# Patient Record
Sex: FEMALE | Race: WHITE | Employment: OTHER | URBAN - METROPOLITAN AREA
[De-identification: names, ages, dates, MRNs, and addresses within clinical notes are randomized per-mention and may not be internally consistent; named-entity substitution may affect disease eponyms.]

---

## 2017-01-11 ENCOUNTER — HOSPITAL ENCOUNTER (OUTPATIENT)
Dept: MAMMOGRAPHY | Age: 67
Discharge: HOME | End: 2017-01-11
Attending: OBSTETRICS & GYNECOLOGY
Payer: MEDICARE

## 2017-01-11 DIAGNOSIS — Z12.31 VISIT FOR SCREENING MAMMOGRAM: ICD-10-CM

## 2017-01-11 PROCEDURE — 77067 SCR MAMMO BI INCL CAD: CPT

## 2017-10-13 ENCOUNTER — HOSPITAL ENCOUNTER (EMERGENCY)
Age: 67
Discharge: HOME | End: 2017-10-13
Attending: EMERGENCY MEDICINE
Payer: MEDICARE

## 2017-10-13 VITALS
TEMPERATURE: 98.5 F | HEART RATE: 77 BPM | BODY MASS INDEX: 28.35 KG/M2 | RESPIRATION RATE: 16 BRPM | WEIGHT: 160 LBS | DIASTOLIC BLOOD PRESSURE: 71 MMHG | HEIGHT: 63 IN | SYSTOLIC BLOOD PRESSURE: 129 MMHG | OXYGEN SATURATION: 97 %

## 2017-10-13 DIAGNOSIS — R42 LIGHTHEADEDNESS: ICD-10-CM

## 2017-10-13 DIAGNOSIS — R00.0 SINUS TACHYCARDIA: Primary | ICD-10-CM

## 2017-10-13 LAB
ALBUMIN SERPL-MCNC: 3.8 G/DL (ref 3.5–5)
ALBUMIN/GLOB SERPL: 0.9 {RATIO} (ref 1.1–2.2)
ALP SERPL-CCNC: 121 U/L (ref 45–117)
ALT SERPL-CCNC: 32 U/L (ref 12–78)
ANION GAP SERPL CALC-SCNC: 9 MMOL/L (ref 5–15)
AST SERPL-CCNC: 21 U/L (ref 15–37)
ATRIAL RATE: 117 BPM
BASOPHILS # BLD: 0.1 K/UL (ref 0–0.1)
BASOPHILS NFR BLD: 1 % (ref 0–1)
BILIRUB SERPL-MCNC: 0.3 MG/DL (ref 0.2–1)
BUN SERPL-MCNC: 18 MG/DL (ref 6–20)
BUN/CREAT SERPL: 18 (ref 12–20)
CALCIUM SERPL-MCNC: 8.8 MG/DL (ref 8.5–10.1)
CALCULATED P AXIS, ECG09: 46 DEGREES
CALCULATED R AXIS, ECG10: 38 DEGREES
CALCULATED T AXIS, ECG11: 13 DEGREES
CHLORIDE SERPL-SCNC: 104 MMOL/L (ref 97–108)
CO2 SERPL-SCNC: 27 MMOL/L (ref 21–32)
CREAT SERPL-MCNC: 1 MG/DL (ref 0.55–1.02)
D DIMER PPP FEU-MCNC: 0.25 MG/L FEU (ref 0–0.65)
DIAGNOSIS, 93000: NORMAL
DIFFERENTIAL METHOD BLD: ABNORMAL
EOSINOPHIL # BLD: 0.5 K/UL (ref 0–0.4)
EOSINOPHIL NFR BLD: 8 % (ref 0–7)
ERYTHROCYTE [DISTWIDTH] IN BLOOD BY AUTOMATED COUNT: 12.5 % (ref 11.5–14.5)
GLOBULIN SER CALC-MCNC: 4.1 G/DL (ref 2–4)
GLUCOSE SERPL-MCNC: 132 MG/DL (ref 65–100)
HCT VFR BLD AUTO: 40.8 % (ref 35–47)
HGB BLD-MCNC: 13.7 G/DL (ref 11.5–16)
LYMPHOCYTES # BLD: 2.3 K/UL (ref 0.8–3.5)
LYMPHOCYTES NFR BLD: 36 % (ref 12–49)
MAGNESIUM SERPL-MCNC: 1.8 MG/DL (ref 1.6–2.4)
MCH RBC QN AUTO: 31.1 PG (ref 26–34)
MCHC RBC AUTO-ENTMCNC: 33.6 G/DL (ref 30–36.5)
MCV RBC AUTO: 92.7 FL (ref 80–99)
MONOCYTES # BLD: 0.6 K/UL (ref 0–1)
MONOCYTES NFR BLD: 10 % (ref 5–13)
NEUTS SEG # BLD: 2.9 K/UL (ref 1.8–8)
NEUTS SEG NFR BLD: 45 % (ref 32–75)
P-R INTERVAL, ECG05: 156 MS
PLATELET # BLD AUTO: 272 K/UL (ref 150–400)
POTASSIUM SERPL-SCNC: 3.9 MMOL/L (ref 3.5–5.1)
PROT SERPL-MCNC: 7.9 G/DL (ref 6.4–8.2)
Q-T INTERVAL, ECG07: 324 MS
QRS DURATION, ECG06: 74 MS
QTC CALCULATION (BEZET), ECG08: 451 MS
RBC # BLD AUTO: 4.4 M/UL (ref 3.8–5.2)
SODIUM SERPL-SCNC: 140 MMOL/L (ref 136–145)
TROPONIN I SERPL-MCNC: 0.05 NG/ML
TSH SERPL DL<=0.05 MIU/L-ACNC: 1.34 UIU/ML (ref 0.36–3.74)
VENTRICULAR RATE, ECG03: 117 BPM
WBC # BLD AUTO: 6.4 K/UL (ref 3.6–11)

## 2017-10-13 PROCEDURE — 96360 HYDRATION IV INFUSION INIT: CPT

## 2017-10-13 PROCEDURE — 93005 ELECTROCARDIOGRAM TRACING: CPT

## 2017-10-13 PROCEDURE — 99284 EMERGENCY DEPT VISIT MOD MDM: CPT

## 2017-10-13 PROCEDURE — 74011250636 HC RX REV CODE- 250/636: Performed by: EMERGENCY MEDICINE

## 2017-10-13 PROCEDURE — 84443 ASSAY THYROID STIM HORMONE: CPT | Performed by: EMERGENCY MEDICINE

## 2017-10-13 PROCEDURE — 85025 COMPLETE CBC W/AUTO DIFF WBC: CPT | Performed by: EMERGENCY MEDICINE

## 2017-10-13 PROCEDURE — 36415 COLL VENOUS BLD VENIPUNCTURE: CPT | Performed by: EMERGENCY MEDICINE

## 2017-10-13 PROCEDURE — 85379 FIBRIN DEGRADATION QUANT: CPT | Performed by: EMERGENCY MEDICINE

## 2017-10-13 PROCEDURE — 80053 COMPREHEN METABOLIC PANEL: CPT | Performed by: EMERGENCY MEDICINE

## 2017-10-13 PROCEDURE — 84484 ASSAY OF TROPONIN QUANT: CPT | Performed by: EMERGENCY MEDICINE

## 2017-10-13 PROCEDURE — 83735 ASSAY OF MAGNESIUM: CPT | Performed by: EMERGENCY MEDICINE

## 2017-10-13 RX ORDER — ESTRADIOL 0.03 MG/D
PATCH TRANSDERMAL
Refills: 5 | COMMUNITY
Start: 2017-09-14

## 2017-10-13 RX ORDER — LISINOPRIL 20 MG/1
40 TABLET ORAL
Status: DISCONTINUED | OUTPATIENT
Start: 2017-10-13 | End: 2017-10-13

## 2017-10-13 RX ORDER — ESTRADIOL 0.1 MG/G
CREAM VAGINAL
Refills: 9 | COMMUNITY
Start: 2017-09-18

## 2017-10-13 RX ORDER — ESZOPICLONE 2 MG/1
TABLET, FILM COATED ORAL
Refills: 1 | COMMUNITY
Start: 2017-09-27 | End: 2020-11-19

## 2017-10-13 RX ADMIN — SODIUM CHLORIDE 1000 ML: 900 INJECTION, SOLUTION INTRAVENOUS at 04:58

## 2017-10-13 NOTE — Clinical Note
- Please call Dr. Fransico Rivas office today and arrange a follow-up appointment for further evaluation. 
- Avoid caffeine and alcohol.  
- TSH (thyroid test) is pending. 
- Return to ED for any concerns.

## 2017-10-13 NOTE — ED NOTES
The patient was discharged home by Dr. Valentine Bal and Emma Vieira rn in stable condition, accompanied by family. The patient is alert and oriented, is in no respiratory distress and has vital signs within normal limits . The patient's diagnosis, condition and treatment were explained to patient. The patient expressed understanding. No prescriptions given to pt. No work/school note given to pt. A discharge plan has been developed. A  was not involved in the process. Aftercare instructions were given to the patient. Pt's saline lock removed without complications. Family will transport pt home.

## 2017-10-13 NOTE — DISCHARGE INSTRUCTIONS
Cardiac Arrhythmia: Care Instructions  Your Care Instructions    A cardiac arrhythmia is a change in the normal rhythm of the heart. Your heart may beat too fast or too slow or beat with an irregular or skipping rhythm. A change in the heart's rhythm may feel like a really strong heartbeat or a fluttering in your chest. A severe heart rhythm problem can keep the body from getting the blood it needs. This can result in shortness of breath, lightheadedness, and fainting. You may take medicine to treat your condition. Your doctor may recommend a pacemaker or recommend catheter ablation to destroy small parts of the heart that are causing a rhythm problem. Another possible treatment is an implantable cardioverter-defibrillator (ICD). An ICD is a device that gives the heart a shock to return the heart to a normal rhythm. Follow-up care is a key part of your treatment and safety. Be sure to make and go to all appointments, and call your doctor if you are having problems. It's also a good idea to know your test results and keep a list of the medicines you take. How can you care for yourself at home? General care  · Be safe with medicines. Take your medicines exactly as prescribed. Call your doctor if you think you are having a problem with your medicine. You will get more details on the specific medicines your doctor prescribes. · If you received a pacemaker or an ICD, you will get a fact sheet about it. · Wear medical alert jewelry that says you have an abnormal heart rhythm. You can buy this at most drugstores. Lifestyle changes  · Eat a heart-healthy diet. · Stay at a healthy weight. Lose weight if you need to. · Avoid nicotine, too much alcohol, and illegal drugs (meth, speed, and cocaine). Also, get enough sleep and do not overeat. · Ask your doctor whether you can take over-the-counter medicines (such as decongestants).  These can make your heart beat fast.  · Talk to your doctor about any limits to activities, such as driving, or tasks where you use power tools or ladders. Activity  · Start light exercise if your doctor says you can. Even a small amount will help you get stronger, have more energy, and manage your stress. · If your doctor recommends it, get more exercise. Walking is a good choice. Bit by bit, increase the amount you walk every day. Try for at least 30 minutes on most days of the week. You also may want to swim, bike, or do other activities. · When you exercise, watch for signs that your heart is working too hard. You are pushing too hard if you cannot talk while you exercise. If you become short of breath or dizzy or have chest pain, sit down and rest.  · Check your pulse daily. Place two fingers on the artery at the palm side of your wrist, in line with your thumb. If your heartbeat seems uneven, talk to your doctor. When should you call for help? Call 911 anytime you think you may need emergency care. For example, call if:  · You passed out (lost consciousness). · You have symptoms of a heart attack. These may include:  ¨ Chest pain or pressure, or a strange feeling in the chest.  ¨ Sweating. ¨ Shortness of breath. ¨ Nausea or vomiting. ¨ Pain, pressure, or a strange feeling in the back, neck, jaw, or upper belly or in one or both shoulders or arms. ¨ Lightheadedness or sudden weakness. ¨ A fast or irregular heartbeat. After you call 911, the  may tell you to chew 1 adult-strength or 2 to 4 low-dose aspirin. Wait for an ambulance. Do not try to drive yourself. · You have signs of a stroke. These include:  ¨ Sudden numbness, paralysis, or weakness in your face, arm, or leg, especially on only one side of your body. ¨ New problems with walking or balance. ¨ Sudden vision changes. ¨ Drooling or slurred speech. ¨ New problems speaking or understanding simple statements, or feeling confused. ¨ A sudden, severe headache that is different from past headaches.   Call your doctor now or seek immediate medical care if:  · You are dizzy or lightheaded, or you feel like you may faint. · You have new or increased shortness of breath. · You had surgery and you have signs of infection, such as:  ¨ Increased pain, swelling, warmth, or redness. ¨ Red streaks leading from the cut (incision). ¨ Pus draining from the incision. ¨ A fever. Watch closely for changes in your health, and be sure to contact your doctor if:  · You do not get better as expected. Where can you learn more? Go to http://cathryn-john.info/. Enter E623 in the search box to learn more about \"Cardiac Arrhythmia: Care Instructions. \"  Current as of: May 5, 2016  Content Version: 11.3  © 7738-4568 [employer] Care instructions adapted under license by [employer] (which disclaims liability or warranty for this information). If you have questions about a medical condition or this instruction, always ask your healthcare professional. [place] [address] any warranty or liability for your use of this information. Lightheadedness or Faintness: Care Instructions  Your Care Instructions  Lightheadedness is a feeling that you are about to faint or \"pass out. \" You do not feel as if you or your surroundings are moving. It is different from vertigo, which is the feeling that you or things around you are spinning or tilting. Lightheadedness usually goes away or gets better when you lie down. If lightheadedness gets worse, it can lead to a fainting spell. It is common to feel lightheaded from time to time. Lightheadedness usually is not caused by a serious problem. It often is caused by a short-lasting drop in blood pressure and blood flow to your head that occurs when you get up too quickly from a seated or lying position. Follow-up care is a key part of your treatment and safety.  Be sure to make and go to all appointments, and call your doctor if you are having problems. It's also a good idea to know your test results and keep a list of the medicines you take. How can you care for yourself at home? · Lie down for 1 or 2 minutes when you feel lightheaded. After lying down, sit up slowly and remain sitting for 1 to 2 minutes before slowly standing up. · Avoid movements, positions, or activities that have made you lightheaded in the past.  · Get plenty of rest, especially if you have a cold or flu, which can cause lightheadedness. · Make sure you drink plenty of fluids, especially if you have a fever or have been sweating. · Do not drive or put yourself and others in danger while you feel lightheaded. When should you call for help? Call 911 anytime you think you may need emergency care. For example, call if:  · You have symptoms of a stroke. These may include:  ¨ Sudden numbness, tingling, weakness, or loss of movement in your face, arm, or leg, especially on only one side of your body. ¨ Sudden vision changes. ¨ Sudden trouble speaking. ¨ Sudden confusion or trouble understanding simple statements. ¨ Sudden problems with walking or balance. ¨ A sudden, severe headache that is different from past headaches. · You have symptoms of a heart attack. These may include:  ¨ Chest pain or pressure, or a strange feeling in the chest.  ¨ Sweating. ¨ Shortness of breath. ¨ Nausea or vomiting. ¨ Pain, pressure, or a strange feeling in the back, neck, jaw, or upper belly or in one or both shoulders or arms. ¨ Lightheadedness or sudden weakness. ¨ A fast or irregular heartbeat. After you call 911, the  may tell you to chew 1 adult-strength or 2 to 4 low-dose aspirin. Wait for an ambulance. Do not try to drive yourself. Watch closely for changes in your health, and be sure to contact your doctor if:  · Your lightheadedness gets worse or does not get better with home care. Where can you learn more? Go to http://cathryn-john.info/.   Enter N159 in the search box to learn more about \"Lightheadedness or Faintness: Care Instructions. \"  Current as of: March 20, 2017  Content Version: 11.3  © 3527-2423 [employer]. Care instructions adapted under license by [employer] (which disclaims liability or warranty for this information). If you have questions about a medical condition or this instruction, always ask your healthcare professional. [place] [address] any warranty or liability for your use of this information.

## 2017-10-13 NOTE — ED PROVIDER NOTES
HPI Comments: The patient presents to the ED with elevated heart rate and dizziness. Symptoms started 20 minutes ago while she was reading in bed. She notes hx elevated heart rate with normal holter by Dr. Edison Pardo 2 years ago. She denies irregular heart beat. HR at home was 120s. She notes normal heart rate 70-80s. She denies any chest pain or SOB. She does feel dizzy, which she describes as being lightheaded. She did have 1 cup coffee yesterday PM and 3 glasses wine last night. She is not on any benzos. She did start lunesta 2 nights ago, but didn't have any Lunesta last night. She notes adequate fluid intake. She took 4 81 mg tabs aspirin prior to arrival. She denies personal or family hx PE/DVT. No famiy hx CAD. She has no other concers at this time. Cards: Dr. Edison Pardo - joycelyn saw 1 year ago. Patient is a 78 y.o. female presenting with rapid heart beat. The history is provided by the patient. Rapid Heart Rate   Pertinent negatives include no chest pain, no abdominal pain, no headaches and no shortness of breath. Past Medical History:   Diagnosis Date    Allergies     Anxiety     Goiter     normal thyroid studies    Headache(784.0)     migraines    HSV (herpes simplex virus) anogenital infection     Hypertension     Psychiatric disorder     depression       Past Surgical History:   Procedure Laterality Date    HX CHOLECYSTECTOMY      HX GYN      c-sect x 2    HX ORTHOPAEDIC  2008    femur    HX TONSIL AND ADENOIDECTOMY           Family History:   Problem Relation Age of Onset    Hypertension Mother     Heart Disease Father     Breast Cancer Other       in 52's       Social History     Social History    Marital status:      Spouse name: N/A    Number of children: N/A    Years of education: N/A     Occupational History    Not on file.      Social History Main Topics    Smoking status: Former Smoker     Quit date: 2005    Smokeless tobacco: Not on file    Alcohol use Yes Comment: 1 dink a night    Drug use: No    Sexual activity: Not on file     Other Topics Concern    Not on file     Social History Narrative    ** Merged History Encounter **              ALLERGIES: Azithromycin; Cefzil [cefprozil]; Cefzil [cefprozil]; Ciprofloxacin; Ciprofloxacin; Clindamycin; Hydrocodone; Macrobid [nitrofurantoin monohyd/m-cryst]; Macrobid [nitrofurantoin monohyd/m-cryst]; [place] Stalling; Penicillins; Penicillins; Pyridium plus [phenazopyridine-butabarb-hyosc]; Pyridium [phenazopyridine]; Sulfa (sulfonamide antibiotics); Sulfa (sulfonamide antibiotics); and Zithromax [azithromycin]    Review of Systems   Constitutional: Negative for appetite change and fever. HENT: Negative for congestion, nosebleeds and sore throat. Eyes: Negative for discharge. Respiratory: Negative for cough, chest tightness and shortness of breath. Cardiovascular: Positive for palpitations. Negative for chest pain. Gastrointestinal: Negative for abdominal pain, diarrhea, nausea and vomiting. Genitourinary: Negative for dysuria. Musculoskeletal: Negative. Skin: Negative for rash. Neurological: Positive for dizziness and light-headedness. Negative for weakness and headaches. Hematological: Negative for adenopathy. Psychiatric/Behavioral: Negative. All other systems reviewed and are negative. Vitals:    10/13/17 0450 10/13/17 0500 10/13/17 0515 10/13/17 0530   BP: (!) 185/94 152/76 139/71 129/71   Pulse: (!) 119 90 85 77   Resp: 16 20 18 16   Temp: 98.5 °F (36.9 °C)      SpO2: 98% 96% 96% 97%   Weight: 72.6 kg (160 lb)      Height: 5' 3\" (1.6 m)               Physical Exam   Constitutional: She is oriented to person, place, and time. She appears well-developed and well-nourished. HENT:   Head: Normocephalic and atraumatic. Mouth/Throat: Oropharynx is clear and moist.   Eyes: Conjunctivae are normal.   Neck: Normal range of motion. Neck supple.    Cardiovascular: Normal rate and normal heart sounds. tachycardia   Pulmonary/Chest: Effort normal and breath sounds normal.   Abdominal: Soft. Bowel sounds are normal. There is no tenderness. Musculoskeletal: Normal range of motion. She exhibits no edema or tenderness. Neurological: She is alert and oriented to person, place, and time. Skin: Skin is warm and dry. Psychiatric: She has a normal mood and affect. Her behavior is normal.   Nursing note and vitals reviewed. [facility]  ED Course       Procedures    ED EKG interpretation:  Rhythm: sinus tachycardia; and regular . Rate (approx.): 117; Axis: normal; P wave: normal; QRS interval: normal ; ST/T wave: normal; This EKG was interpreted by Harish Gillis MD,ED Provider. A/P:  1. Sinus tachycardia - unclear etiology. Resolved. Recommended f/u with Dr. Adan Hathaway. Avoid alcohol and caffeine. 2. Dizziness - resolved. Patient's results have been reviewed with them. Patient and/or family have verbally conveyed their understanding and agreement of the patient's signs, symptoms, diagnosis, treatment and prognosis and additionally agree to follow up as recommended or return to the Emergency Room should their condition change prior to follow-up. Discharge instructions have also been provided to the patient with some educational information regarding their diagnosis as well a list of reasons why they would want to return to the ER prior to their follow-up appointment should their condition change.

## 2018-01-26 ENCOUNTER — HOSPITAL ENCOUNTER (OUTPATIENT)
Dept: MAMMOGRAPHY | Age: 68
Discharge: HOME | End: 2018-01-26
Attending: FAMILY MEDICINE
Payer: MEDICARE

## 2018-01-26 DIAGNOSIS — Z12.31 VISIT FOR SCREENING MAMMOGRAM: ICD-10-CM

## 2018-01-26 PROCEDURE — 77067 SCR MAMMO BI INCL CAD: CPT

## 2019-02-11 ENCOUNTER — HOSPITAL ENCOUNTER (OUTPATIENT)
Dept: MAMMOGRAPHY | Age: 69
Discharge: HOME | End: 2019-02-11
Attending: FAMILY MEDICINE
Payer: MEDICARE

## 2019-02-11 DIAGNOSIS — Z12.39 BREAST SCREENING: ICD-10-CM

## 2019-02-11 PROCEDURE — 77067 SCR MAMMO BI INCL CAD: CPT

## 2019-03-22 ENCOUNTER — HOSPITAL ENCOUNTER (OUTPATIENT)
Dept: MAMMOGRAPHY | Age: 69
Discharge: HOME | End: 2019-03-22
Attending: SPECIALIST
Payer: MEDICARE

## 2019-03-22 DIAGNOSIS — Z78.0 MENOPAUSE: ICD-10-CM

## 2019-03-22 PROCEDURE — 77080 DXA BONE DENSITY AXIAL: CPT

## 2019-12-19 NOTE — ED TRIAGE NOTES
Endocrinology Pt presents with complaint of rapid heart beat while reading in bed 20 min ago. Pt denies chest pain and sob. Pt reports being dizzy.  Pt took 4 81mg aspirin prior to arrival.

## 2020-07-02 ENCOUNTER — HOSPITAL ENCOUNTER (OUTPATIENT)
Dept: MAMMOGRAPHY | Age: 70
Discharge: HOME | End: 2020-07-02
Attending: FAMILY MEDICINE
Payer: MEDICARE

## 2020-07-02 DIAGNOSIS — Z12.31 VISIT FOR SCREENING MAMMOGRAM: ICD-10-CM

## 2020-07-02 PROCEDURE — 77067 SCR MAMMO BI INCL CAD: CPT

## 2020-11-19 ENCOUNTER — OFFICE VISIT (OUTPATIENT)
Dept: CARDIOLOGY CLINIC | Age: 70
End: 2020-11-19
Payer: MEDICARE

## 2020-11-19 VITALS
DIASTOLIC BLOOD PRESSURE: 78 MMHG | BODY MASS INDEX: 29.77 KG/M2 | HEIGHT: 63 IN | OXYGEN SATURATION: 98 % | WEIGHT: 168 LBS | SYSTOLIC BLOOD PRESSURE: 148 MMHG | HEART RATE: 75 BPM

## 2020-11-19 DIAGNOSIS — R00.2 PALPITATIONS: ICD-10-CM

## 2020-11-19 DIAGNOSIS — R00.0 TACHYCARDIA: Primary | ICD-10-CM

## 2020-11-19 PROCEDURE — G9717 DOC PT DX DEP/BP F/U NT REQ: HCPCS | Performed by: SPECIALIST

## 2020-11-19 PROCEDURE — 99204 OFFICE O/P NEW MOD 45 MIN: CPT | Performed by: SPECIALIST

## 2020-11-19 PROCEDURE — G8536 NO DOC ELDER MAL SCRN: HCPCS | Performed by: SPECIALIST

## 2020-11-19 PROCEDURE — G8399 PT W/DXA RESULTS DOCUMENT: HCPCS | Performed by: SPECIALIST

## 2020-11-19 PROCEDURE — G8427 DOCREV CUR MEDS BY ELIG CLIN: HCPCS | Performed by: SPECIALIST

## 2020-11-19 PROCEDURE — G0463 HOSPITAL OUTPT CLINIC VISIT: HCPCS | Performed by: SPECIALIST

## 2020-11-19 PROCEDURE — 1090F PRES/ABSN URINE INCON ASSESS: CPT | Performed by: SPECIALIST

## 2020-11-19 PROCEDURE — G9899 SCRN MAM PERF RSLTS DOC: HCPCS | Performed by: SPECIALIST

## 2020-11-19 PROCEDURE — 3017F COLORECTAL CA SCREEN DOC REV: CPT | Performed by: SPECIALIST

## 2020-11-19 PROCEDURE — G8419 CALC BMI OUT NRM PARAM NOF/U: HCPCS | Performed by: SPECIALIST

## 2020-11-19 PROCEDURE — 93010 ELECTROCARDIOGRAM REPORT: CPT | Performed by: SPECIALIST

## 2020-11-19 PROCEDURE — 1101F PT FALLS ASSESS-DOCD LE1/YR: CPT | Performed by: SPECIALIST

## 2020-11-19 PROCEDURE — 93005 ELECTROCARDIOGRAM TRACING: CPT | Performed by: SPECIALIST

## 2020-11-19 RX ORDER — DOXYCYCLINE 100 MG/1
100 TABLET ORAL DAILY
COMMUNITY

## 2020-11-19 RX ORDER — CALCIUM CARBONATE/VITAMIN D3 600 MG-125
600 TABLET ORAL 2 TIMES DAILY
COMMUNITY

## 2020-11-19 RX ORDER — GLUCOSAMINE/CHONDR SU A SOD 750-600 MG
10000 TABLET ORAL
COMMUNITY

## 2020-11-19 NOTE — PROGRESS NOTES
Chief Complaint   Patient presents with    New Patient    Rapid Heart Rate     Visit Vitals  BP (!) 148/78 (BP 1 Location: Left arm, BP Patient Position: Sitting)   Pulse 75   Ht 5' 3\" (1.6 m)   Wt 168 lb (76.2 kg)   SpO2 98%   BMI 29.76 kg/m²     Chest pain denied   SOB denied   Palpitations denied   Swelling in hands/feet denied   Dizziness denied   Recent hospital stays denied   Refills denied   Vertigo; couldn't find cause with ENT (records received)  Saw Dr. Manuela Zhu a few years ago for the same issue. Tachy wakes up for about 15 min. Doesn't feel palps. Pt brought home cuff, reads 148/87 pulse 92.

## 2020-11-19 NOTE — PROGRESS NOTES
Chon Low MD. [facility]              Patient: Kareem Ferrera  : 1950      Today's Date: 2020          HISTORY OF PRESENT ILLNESS:     History of Present Illness:    She had a rapid heart beat 3 years ago and saw Dr. Tracy Childers - workup was OK and then symptoms resolved. This summer she would be playing golf and feel like heart would race -- then she would wake up and HR would be 116 in middle of the night. Lasts about 10 min. No exertional CP or SOB. She had a day of vertigo few weeks ago -- ENT workup was OK and she is fine since then. PAST MEDICAL HISTORY:     Past Medical History:   Diagnosis Date    Allergies     Anxiety     CKD (chronic kidney disease)     Goiter     normal thyroid studies    Headache(784.0)     migraines    HSV (herpes simplex virus) anogenital infection     Hypertension     Migraine     BRANDEN (obstructive sleep apnea)     mild, oral appliance    Psychiatric disorder     depression       Past Surgical History:   Procedure Laterality Date    HX CHOLECYSTECTOMY      HX GYN      c-sect x 2    HX ORTHOPAEDIC      femur    HX TONSIL AND ADENOIDECTOMY           MEDICATIONS:     Current Outpatient Medications   Medication Sig Dispense Refill    doxycycline (ADOXA) 100 mg tablet Take 100 mg by mouth daily. As needed for rosacea flares      calcium-cholecalciferol, d3, (CALCIUM 600 + D) 600-125 mg-unit tab Take 600 mg by mouth two (2) times a day.  Biotin 2,500 mcg cap Take 10,000 mcg by mouth.  docosahexaenoic acid/epa (FISH OIL PO) Take 3,000 Units by mouth daily.  Lactobacillus acidophilus (PROBIOTIC PO) Take  by mouth nightly. Align      ESTRACE 0.01 % (0.1 mg/gram) vaginal cream INSERT 1 GRAM VAGINALLY THREE TIMES PER WEEK FOR 21 DAYS  9    estradiol (CLIMARA) 0.025 mg/24 hr APPLY ONE PATCH TO LOWER ABDOMEN ONCE WEEKLY  5    lisinopril (PRINIVIL, ZESTRIL) 40 mg tablet Take 20 mg by mouth daily.       lansoprazole (PREVACID) 30 mg capsule Take  by mouth Daily (before breakfast).  EPINEPHrine (EPIPEN) 0.3 mg/0.3 mL injection 0.3 mL by IntraMUSCular route once as needed for 1 dose. 2 Each 0    buPROPion XL (WELLBUTRIN XL) 150 mg tablet Take 2 Tabs by mouth every morning. (Patient taking differently: Take 150 mg by mouth every morning.) 180 Tab 3    multivit-mineral-iron-lutein (CENTRUM SILVER ULTRA WOMEN'S) Tab Take 1 Tab by mouth daily. 1 Tab prn    acyclovir (ZOVIRAX) 400 mg tablet Take 400 mg by mouth every four (4) hours (while awake). Allergies   Allergen Reactions    Azithromycin Rash    Cefzil [Cefprozil] Itching    Cefzil [Cefprozil] Rash    Ciprofloxacin Rash    Ciprofloxacin Rash    Clindamycin Hives    Hydrocodone Itching    Macrobid [Nitrofurantoin Monohyd/M-Cryst] Hives    Macrobid [Nitrofurantoin Monohyd/M-Cryst] Hives    Osphena [Ospemifene] Rash    Penicillins Rash    Penicillins Rash    Pyridium Plus [Phenazopyridine-Butabarb-Hyosc] Hives    Pyridium [Phenazopyridine] Hives    Sulfa (Sulfonamide Antibiotics) Rash    Sulfa (Sulfonamide Antibiotics) Rash    Zithromax [Azithromycin] Rash           SOCIAL HISTORY:     Social History     Tobacco Use    Smoking status: Never Smoker    Smokeless tobacco: Never Used   Substance Use Topics    Alcohol use: Yes     Frequency: 4 or more times a week     Drinks per session: 1 or 2     Comment: 1 dink a night    Drug use: No         FAMILY HISTORY:     Family History   Problem Relation Age of Onset    Hypertension Mother     Heart Disease Father     Breast Cancer Other          in 52's           REVIEW OF SYMPTOMS:     Review of Symptoms:  Constitutional: Negative for fever, chills  HEENT: Negative for nosebleeds, tinnitus, and vision changes. Respiratory: Negative for cough, wheezing  Cardiovascular: Negative for orthopnea, claudication, syncope, and PND. Gastrointestinal: Negative for abdominal pain, diarrhea, melena.    Genitourinary: Negative for dysuria  Musculoskeletal: Negative for myalgias. Skin: Negative for rash  Heme: No problems bleeding. Neurological: Negative for speech change and focal weakness. PHYSICAL EXAM:     Physical Exam:  Visit Vitals  BP (!) 148/78 (BP 1 Location: Left arm, BP Patient Position: Sitting)   Pulse 75   Ht 5' 3\" (1.6 m)   Wt 168 lb (76.2 kg)   LMP 01/01/1994   SpO2 98%   BMI 29.76 kg/m²     Patient appears generally well, mood and affect are appropriate and pleasant. HEENT:  Hearing intact, non-icteric, normocephalic, atraumatic. Neck Exam: Supple, No JVD or carotid bruits. Lung Exam: Clear to auscultation, even breath sounds. Cardiac Exam: Regular rate and rhythm with no murmur or rub  Abdomen: Soft, non-tender, normal bowel sounds. No bruits or masses. Extremities: Moves all ext well. No lower extremity edema. MSKTL: Overall good ROM ext  Skin: No significant rashes  Vascular: 2+ dorsalis pedis pulses bilaterally. Psych: Appropriate affect  Neuro - Grossly intact      LABS / OTHER STUDIES:     Labs with endocrine. CARDIAC DIAGNOSTICS:     Cardiac Evaluation Includes:    Stress Echo 3/5/12 - 9 min, normal study   CT Heart 3/9/12 - CAC score 0  Heart Monitor 8/16/13 - sinus tach, Avg HR 86, --- patient had a HR > 120 bpm for 3hr and 30 min   Carotid Doppler 6/9/16 - < 50% stenosis bilat       EKG 11/19/20 - NSR, normal         ASSESSMENT AND PLAN:     Assessment and Plan:  1) Heart racing spells once a week or so   - She saw Dr. Charly Valenzuela in the past with a normal workup previously (see above)   - Will check a 30 day monitor   - Will check an echo   - She had labs checked with Endocrine which she says was OK (Dr. Barnard Speaks). - advised cutting back caffeine (also has cut back Wellbutrin)    2) HTN  - Although BP is high today, looks good at home   - continue lisinopril and follow     3) See me in 5 weeks    Retired from [employer]. Lives with . Enjoys golf and Kayaking. Neptali Vallejo MD, [place] [id number]  [address], [address] [place] [address]  Ph: [phone]    [phone]      ADDENDUM   1/5/2021  Event Monitor 12/2/20 -12/23/20 - sinus; symptoms associated with sinus - normal study     Will call     ADDENDUM   1/6/2021  I called patient and left a VM.

## 2020-11-30 ENCOUNTER — TELEPHONE (OUTPATIENT)
Dept: CARDIOLOGY CLINIC | Age: 70
End: 2020-11-30

## 2020-11-30 NOTE — TELEPHONE ENCOUNTER
Good Morning,    The patient calling to have a nurse call to see why it's taking her heart monitor so long to get to her    [employer]

## 2020-11-30 NOTE — TELEPHONE ENCOUNTER
Spoke with pt. Let her know monitor was mailed out from [employer] on Friday Nov 27 and ups tracking shows it to be delivered today by 9:00pm.  Pt verbalized understanding and will call back if she does not receive monitor today.

## 2020-12-14 ENCOUNTER — TELEPHONE (OUTPATIENT)
Dept: CARDIOLOGY CLINIC | Age: 70
End: 2020-12-14

## 2020-12-14 NOTE — TELEPHONE ENCOUNTER
Spoke to pt,  She had an event last night and was asking if it was recorded on loop. She stated it was at about 45% battery and wanted to make sure everything was ok. Pulled up [employer] and let her know there were only stable events thus far and that the company would call her if there were any adverse events to make sure she was ok. All questions answered. Pt satisfied.

## 2021-01-07 ENCOUNTER — OFFICE VISIT (OUTPATIENT)
Dept: CARDIOLOGY CLINIC | Age: 71
End: 2021-01-07
Payer: MEDICARE

## 2021-01-07 ENCOUNTER — ANCILLARY PROCEDURE (OUTPATIENT)
Dept: CARDIOLOGY CLINIC | Age: 71
End: 2021-01-07
Payer: MEDICARE

## 2021-01-07 VITALS
HEART RATE: 71 BPM | WEIGHT: 169 LBS | OXYGEN SATURATION: 98 % | SYSTOLIC BLOOD PRESSURE: 120 MMHG | DIASTOLIC BLOOD PRESSURE: 78 MMHG | BODY MASS INDEX: 29.95 KG/M2 | HEIGHT: 63 IN | RESPIRATION RATE: 18 BRPM

## 2021-01-07 VITALS
SYSTOLIC BLOOD PRESSURE: 120 MMHG | BODY MASS INDEX: 29.95 KG/M2 | HEIGHT: 63 IN | DIASTOLIC BLOOD PRESSURE: 78 MMHG | WEIGHT: 169 LBS

## 2021-01-07 DIAGNOSIS — R00.2 PALPITATIONS: Primary | ICD-10-CM

## 2021-01-07 DIAGNOSIS — I10 ESSENTIAL HYPERTENSION: ICD-10-CM

## 2021-01-07 DIAGNOSIS — I10 HYPERTENSION, UNSPECIFIED TYPE: ICD-10-CM

## 2021-01-07 DIAGNOSIS — R00.0 TACHYCARDIA: ICD-10-CM

## 2021-01-07 LAB
ECHO AO ASC DIAM: 2.82 CM
ECHO AO ROOT DIAM: 2.97 CM
ECHO AV AREA PEAK VELOCITY: 2.98 CM2
ECHO AV AREA VTI: 3.16 CM2
ECHO AV AREA/BSA PEAK VELOCITY: 1.7 CM2/M2
ECHO AV AREA/BSA VTI: 1.8 CM2/M2
ECHO AV MEAN GRADIENT: 2.71 MMHG
ECHO AV PEAK GRADIENT: 4.95 MMHG
ECHO AV PEAK VELOCITY: 111.22 CM/S
ECHO AV VTI: 24.86 CM
ECHO LA AREA 4C: 17.79 CM2
ECHO LA MAJOR AXIS: 3.71 CM
ECHO LA MINOR AXIS: 2.06 CM
ECHO LA VOL 2C: 54.98 ML (ref 22–52)
ECHO LA VOL 4C: 47.68 ML (ref 22–52)
ECHO LA VOL BP: 55.53 ML (ref 22–52)
ECHO LA VOL/BSA BIPLANE: 30.85 ML/M2 (ref 16–28)
ECHO LA VOLUME INDEX A2C: 30.54 ML/M2 (ref 16–28)
ECHO LA VOLUME INDEX A4C: 26.49 ML/M2 (ref 16–28)
ECHO LV E' LATERAL VELOCITY: 9.32 CM/S
ECHO LV E' SEPTAL VELOCITY: 6.32 CM/S
ECHO LV EDV A2C: 51.98 ML
ECHO LV EDV A4C: 59.14 ML
ECHO LV EDV BP: 55.18 ML (ref 56–104)
ECHO LV EDV INDEX A4C: 32.9 ML/M2
ECHO LV EDV INDEX BP: 30.7 ML/M2
ECHO LV EDV NDEX A2C: 28.9 ML/M2
ECHO LV EJECTION FRACTION A2C: 65 PERCENT
ECHO LV EJECTION FRACTION A4C: 61 PERCENT
ECHO LV EJECTION FRACTION BIPLANE: 63 PERCENT (ref 55–100)
ECHO LV ESV A2C: 18.1 ML
ECHO LV ESV A4C: 23.23 ML
ECHO LV ESV BP: 20.42 ML (ref 19–49)
ECHO LV ESV INDEX A2C: 10.1 ML/M2
ECHO LV ESV INDEX A4C: 12.9 ML/M2
ECHO LV ESV INDEX BP: 11.3 ML/M2
ECHO LV INTERNAL DIMENSION DIASTOLIC: 4.32 CM (ref 3.9–5.3)
ECHO LV INTERNAL DIMENSION SYSTOLIC: 2.84 CM
ECHO LV IVSD: 0.98 CM (ref 0.6–0.9)
ECHO LV MASS 2D: 128 G (ref 67–162)
ECHO LV MASS INDEX 2D: 71.1 G/M2 (ref 43–95)
ECHO LV POSTERIOR WALL DIASTOLIC: 0.86 CM (ref 0.6–0.9)
ECHO LVOT DIAM: 1.96 CM
ECHO LVOT PEAK GRADIENT: 4.85 MMHG
ECHO LVOT PEAK VELOCITY: 110.13 CM/S
ECHO LVOT SV: 78.6 ML
ECHO LVOT VTI: 26.14 CM
ECHO MV A VELOCITY: 61.94 CM/S
ECHO MV E DECELERATION TIME (DT): 169.59 MS
ECHO MV E VELOCITY: 73.22 CM/S
ECHO MV E/A RATIO: 1.18
ECHO MV E/E' LATERAL: 7.86
ECHO MV E/E' RATIO (AVERAGED): 9.72
ECHO MV E/E' SEPTAL: 11.59
ECHO MV PRESSURE HALF TIME (PHT): 49.18 MS
ECHO RA AREA 4C: 11.25 CM2
ECHO RV INTERNAL DIMENSION: 2.84 CM
ECHO RV TAPSE: 1.87 CM (ref 1.5–2)

## 2021-01-07 PROCEDURE — 1101F PT FALLS ASSESS-DOCD LE1/YR: CPT | Performed by: SPECIALIST

## 2021-01-07 PROCEDURE — G9899 SCRN MAM PERF RSLTS DOC: HCPCS | Performed by: SPECIALIST

## 2021-01-07 PROCEDURE — G8754 DIAS BP LESS 90: HCPCS | Performed by: SPECIALIST

## 2021-01-07 PROCEDURE — G0463 HOSPITAL OUTPT CLINIC VISIT: HCPCS | Performed by: SPECIALIST

## 2021-01-07 PROCEDURE — G8752 SYS BP LESS 140: HCPCS | Performed by: SPECIALIST

## 2021-01-07 PROCEDURE — 3017F COLORECTAL CA SCREEN DOC REV: CPT | Performed by: SPECIALIST

## 2021-01-07 PROCEDURE — G9717 DOC PT DX DEP/BP F/U NT REQ: HCPCS | Performed by: SPECIALIST

## 2021-01-07 PROCEDURE — G8427 DOCREV CUR MEDS BY ELIG CLIN: HCPCS | Performed by: SPECIALIST

## 2021-01-07 PROCEDURE — G8536 NO DOC ELDER MAL SCRN: HCPCS | Performed by: SPECIALIST

## 2021-01-07 PROCEDURE — 1090F PRES/ABSN URINE INCON ASSESS: CPT | Performed by: SPECIALIST

## 2021-01-07 PROCEDURE — G8419 CALC BMI OUT NRM PARAM NOF/U: HCPCS | Performed by: SPECIALIST

## 2021-01-07 PROCEDURE — G8399 PT W/DXA RESULTS DOCUMENT: HCPCS | Performed by: SPECIALIST

## 2021-01-07 PROCEDURE — 99214 OFFICE O/P EST MOD 30 MIN: CPT | Performed by: SPECIALIST

## 2021-01-07 PROCEDURE — 93306 TTE W/DOPPLER COMPLETE: CPT | Performed by: SPECIALIST

## 2021-01-07 RX ORDER — ESTRADIOL 0.1 MG/G
CREAM VAGINAL
COMMUNITY

## 2021-01-07 RX ORDER — CALC/MAG/B COMPLEX/D3/HERB 61
TABLET ORAL
COMMUNITY

## 2021-01-07 RX ORDER — EPINEPHRINE 0.3 MG/.3ML
INJECTION SUBCUTANEOUS
COMMUNITY

## 2021-01-07 RX ORDER — CHOLECALCIFEROL (VITAMIN D3) 50 MCG
CAPSULE ORAL
COMMUNITY

## 2021-01-07 RX ORDER — LISINOPRIL 20 MG/1
TABLET ORAL
COMMUNITY

## 2021-01-07 RX ORDER — MUPIROCIN 20 MG/G
OINTMENT TOPICAL
COMMUNITY

## 2021-01-07 RX ORDER — FLUCONAZOLE 150 MG/1
TABLET ORAL
COMMUNITY

## 2021-01-07 RX ORDER — CALCIUM CARB/VITAMIN D3/VIT K1 500-500-40
TABLET,CHEWABLE ORAL
COMMUNITY

## 2021-01-07 RX ORDER — ESZOPICLONE 1 MG/1
TABLET, FILM COATED ORAL
COMMUNITY

## 2021-01-07 RX ORDER — ALUMINUM ZIRCONIUM OCTACHLOROHYDREX GLY 16 G/100G
GEL TOPICAL
COMMUNITY

## 2021-01-07 NOTE — PROGRESS NOTES
Elin Obrien MD. [facility] [place]              Patient: Manuel Santamaria  : 1950      Today's Date: 2021            HISTORY OF PRESENT ILLNESS:     History of Present Illness:  Here for follow-up. Doing better. Less palpitations lately. BP OK at home. No CP or SOB. She feels well. PAST MEDICAL HISTORY:     Past Medical History:   Diagnosis Date    Allergies     Anxiety     CKD (chronic kidney disease)     Goiter     normal thyroid studies    Headache(784.0)     migraines    HSV (herpes simplex virus) anogenital infection     Hypertension     Migraine     BRANDEN (obstructive sleep apnea)     mild, oral appliance    Psychiatric disorder     depression       Past Surgical History:   Procedure Laterality Date    HX CHOLECYSTECTOMY      HX GYN      c-sect x 2    HX ORTHOPAEDIC  2008    femur    HX TONSIL AND ADENOIDECTOMY           MEDICATIONS:     Current Outpatient Medications   Medication Sig Dispense Refill    lansoprazole (PREVACID) 15 mg capsule lansoprazole 15 mg cpdr      lisinopriL (PRINIVIL, ZESTRIL) 20 mg tablet lisinopril 20 mg tabs      estradioL (ESTRACE) 0.01 % (0.1 mg/gram) vaginal cream estradiol 0.1 mg/gm crea      EPINEPHrine (EpiPen 2-Johnathon) 0.3 mg/0.3 mL injection EpiPen 2-Johnathon 0.3 mg/0.3 mL injection, auto-injector      omega 3-dha-epa-fish oil (Fish Oil) 100-160-1,000 mg cap Take  by mouth.  Bifidobacterium Infantis (Align) 4 mg cap Take  by mouth.  doxycycline (ADOXA) 100 mg tablet Take 100 mg by mouth daily. As needed for rosacea flares      calcium-cholecalciferol, d3, (CALCIUM 600 + D) 600-125 mg-unit tab Take 600 mg by mouth two (2) times a day.  Biotin 2,500 mcg cap Take 10,000 mcg by mouth.  docosahexaenoic acid/epa (FISH OIL PO) Take 3,000 Units by mouth daily.  Lactobacillus acidophilus (PROBIOTIC PO) Take  by mouth nightly.  Align      ESTRACE 0.01 % (0.1 mg/gram) vaginal cream INSERT 1 GRAM VAGINALLY THREE TIMES PER WEEK FOR 21 DAYS  9    lansoprazole (PREVACID) 30 mg capsule Take  by mouth Daily (before breakfast).  EPINEPHrine (EPIPEN) 0.3 mg/0.3 mL injection 0.3 mL by IntraMUSCular route once as needed for 1 dose. 2 Each 0    buPROPion XL (WELLBUTRIN XL) 150 mg tablet Take 2 Tabs by mouth every morning. (Patient taking differently: Take 150 mg by mouth every morning.) 180 Tab 3    multivit-mineral-iron-lutein (CENTRUM SILVER ULTRA WOMEN'S) Tab Take 1 Tab by mouth daily. 1 Tab prn    acyclovir (ZOVIRAX) 400 mg tablet Take 400 mg by mouth every four (4) hours (while awake).  mupirocin (BACTROBAN) 2 % ointment mupirocin 2 % oint      multivit-min-ferrous fumarate (Multi Vitamin) 9 mg iron/15 mL liqd Multi Vitamin      eszopiclone (LUNESTA) 1 mg tablet eszopiclone 1 mg tabs      fluconazole (DIFLUCAN) 150 mg tablet fluconazole 150 mg tabs      estradiol (CLIMARA) 0.025 mg/24 hr APPLY ONE PATCH TO LOWER ABDOMEN ONCE WEEKLY  5    lisinopril (PRINIVIL, ZESTRIL) 40 mg tablet Take 20 mg by mouth daily.          Allergies   Allergen Reactions    Azithromycin Rash    Cefzil [Cefprozil] Itching    Cefzil [Cefprozil] Rash    Ciprofloxacin Rash    Ciprofloxacin Rash    Clindamycin Hives    Hydrocodone Itching    Macrobid [Nitrofurantoin Monohyd/M-Cryst] Hives    Macrobid [Nitrofurantoin Monohyd/M-Cryst] Hives    Osphena [Ospemifene] Rash    Penicillins Rash    Penicillins Rash    Pyridium Plus [Phenazopyridine-Butabarb-Hyosc] Hives    Pyridium [Phenazopyridine] Hives    Sulfa (Sulfonamide Antibiotics) Rash    Sulfa (Sulfonamide Antibiotics) Rash    Zithromax [Azithromycin] Rash           SOCIAL HISTORY:     Social History     Tobacco Use    Smoking status: Never Smoker    Smokeless tobacco: Never Used   Substance Use Topics    Alcohol use: Yes     Frequency: 4 or more times a week     Drinks per session: 1 or 2     Comment: 1 dink a night    Drug use: No         FAMILY HISTORY:     Family History Problem Relation Age of Onset    Hypertension Mother     Heart Disease Father     Breast Cancer Other          in 52's            REVIEW OF SYMPTOMS:      Review of Symptoms:  Constitutional: Negative for fever, chills  HEENT: Negative for nosebleeds, tinnitus, and vision changes. Respiratory: Negative for cough, wheezing  Cardiovascular: Negative for orthopnea, claudication, syncope, and PND. Gastrointestinal: Negative for abdominal pain, diarrhea, melena. Genitourinary: Negative for dysuria  Musculoskeletal: Negative for myalgias. Skin: Negative for rash  Heme: No problems bleeding. Neurological: Negative for speech change and focal weakness.            PHYSICAL EXAM:      Physical Exam:  Visit Vitals  /78   Pulse 71   Resp 18   Ht 5' 3\" (1.6 m)   Wt 169 lb (76.7 kg)   LMP 1994   SpO2 98%   BMI 29.94 kg/m²       Patient appears generally well, mood and affect are appropriate and pleasant. HEENT:  Hearing intact, non-icteric, normocephalic, atraumatic. Neck Exam: Supple, No JVD    Lung Exam: Clear to auscultation, even breath sounds. Cardiac Exam: Regular rate and rhythm with no murmur or rub  Abdomen: Soft, non-tender   Extremities: Moves all ext well. No lower extremity edema.   MSKTL: Overall good ROM ext  Skin: No significant rashes  Psych: Appropriate affect  Neuro - Grossly intact        LABS / OTHER STUDIES:      Labs with endocrine.         CARDIAC DIAGNOSTICS:      Cardiac Evaluation Includes:     Stress Echo 3/5/12 - 9 min, normal study   CT Heart 3/9/12 - CAC score 0  Heart Monitor 13 - sinus tach, Avg HR 86, --- patient had a HR > 120 bpm for 3hr and 30 min   Carotid Doppler 16 - < 50% stenosis bilat     Event Monitor 20 -20 - sinus; symptoms associated with sinus and some PAC/PVC's  - Overall normal study      Echo 21 - LVEF 60% - PRELIM      EKG 20 - NSR, normal            ASSESSMENT AND PLAN:      Assessment and Plan:  1) Heart racing spells / palpitations   - She saw Dr. Juan R Mckeon in the past with a normal workup previously (see above)   - Event Monitor 12/2/20 -12/23/20 - sinus; symptoms associated with sinus and some PAC/PVC's  - Overall normal study   - Echo shows normal findings   - She had labs checked with Endocrine which she says was OK (Dr. Niyah Rutherford). - advised cutting back caffeine (also has cut back Wellbutrin)   - discussed a BB but she does not need it now   - Asked her to stay well hydrated      2) HTN  - BP looks good at home   - continue lisinopril and follow   - asked her to stay well hydrated      3) See me back as needed.       Retired from [employer]. Lives with . Enjoys golf and [address], [place], [address] [place] [address]     [place] .  [address] [place] [address], [place] De La Rosa[address]  Ph: [phone]                               Ph [phone]

## 2021-01-07 NOTE — PROGRESS NOTES
Rain Bullard is a 72 y.o. female  Chief Complaint   Patient presents with    Cardiac Testing    Follow-up     1 Month     Health Maintenance Due   Topic Date Due    Hepatitis C Screening  1950    DTaP/Tdap/Td series (1 - Tdap) 01/23/1972    Shingrix Vaccine Age 50> (1 of 2) 01/23/2001    Colorectal Cancer Screening Combo  01/23/2001    Lipid Screen  09/28/2015    GLAUCOMA SCREENING Q2Y  01/23/2016    Pneumococcal 65+ years (1 of 1 - PPSV23) 01/23/2016    Medicare Yearly Exam  10/29/2018    Flu Vaccine (1) 09/01/2020     Visit Vitals  /78   Pulse 71   Resp 18   Ht 5' 3\" (1.6 m)   Wt 169 lb (76.7 kg)   SpO2 98%   BMI 29.94 kg/m²

## 2021-06-03 ENCOUNTER — TRANSCRIBE ORDER (OUTPATIENT)
Dept: SCHEDULING | Age: 71
End: 2021-06-03

## 2021-06-03 DIAGNOSIS — Z12.31 VISIT FOR SCREENING MAMMOGRAM: Primary | ICD-10-CM

## 2021-06-07 ENCOUNTER — TRANSCRIBE ORDER (OUTPATIENT)
Dept: SCHEDULING | Age: 71
End: 2021-06-07

## 2021-06-07 DIAGNOSIS — Z78.0 MENOPAUSE: Primary | ICD-10-CM

## 2021-07-06 ENCOUNTER — HOSPITAL ENCOUNTER (OUTPATIENT)
Dept: MAMMOGRAPHY | Age: 71
Discharge: HOME | End: 2021-07-06
Attending: SPECIALIST
Payer: MEDICARE

## 2021-07-06 ENCOUNTER — HOSPITAL ENCOUNTER (OUTPATIENT)
Dept: MAMMOGRAPHY | Age: 71
Discharge: HOME | End: 2021-07-06
Attending: FAMILY MEDICINE
Payer: MEDICARE

## 2021-07-06 DIAGNOSIS — Z12.31 VISIT FOR SCREENING MAMMOGRAM: ICD-10-CM

## 2021-07-06 DIAGNOSIS — Z78.0 MENOPAUSE: ICD-10-CM

## 2021-07-06 PROCEDURE — 77080 DXA BONE DENSITY AXIAL: CPT

## 2021-07-06 PROCEDURE — 77067 SCR MAMMO BI INCL CAD: CPT

## 2022-06-21 ENCOUNTER — TRANSCRIBE ORDER (OUTPATIENT)
Dept: SCHEDULING | Age: 72
End: 2022-06-21

## 2022-06-21 DIAGNOSIS — Z12.31 SCREENING MAMMOGRAM FOR HIGH-RISK PATIENT: Primary | ICD-10-CM

## 2023-04-21 DIAGNOSIS — Z12.31 SCREENING MAMMOGRAM FOR HIGH-RISK PATIENT: Primary | ICD-10-CM

## 2023-05-16 ENCOUNTER — TRANSCRIBE ORDERS (OUTPATIENT)
Facility: HOSPITAL | Age: 73
End: 2023-05-16

## 2023-05-16 DIAGNOSIS — Z12.31 VISIT FOR SCREENING MAMMOGRAM: Primary | ICD-10-CM

## 2023-08-25 ENCOUNTER — HOSPITAL ENCOUNTER (OUTPATIENT)
Facility: HOSPITAL | Age: 73
End: 2023-08-25
Payer: MEDICARE

## 2023-08-25 DIAGNOSIS — Z12.31 VISIT FOR SCREENING MAMMOGRAM: ICD-10-CM

## 2023-08-25 PROCEDURE — 77067 SCR MAMMO BI INCL CAD: CPT

## 2023-08-30 ENCOUNTER — HOSPITAL ENCOUNTER (OUTPATIENT)
Facility: HOSPITAL | Age: 73
Discharge: HOME | End: 2023-09-02
Payer: MEDICARE

## 2023-08-30 DIAGNOSIS — Z78.0 ASYMPTOMATIC POSTMENOPAUSAL STATUS: ICD-10-CM

## 2023-08-30 PROCEDURE — 77080 DXA BONE DENSITY AXIAL: CPT

## 2024-09-16 ENCOUNTER — HOSPITAL ENCOUNTER (OUTPATIENT)
Facility: HOSPITAL | Age: 74
Discharge: HOME | End: 2024-09-19
Payer: MEDICARE

## 2024-09-16 VITALS — HEIGHT: 63 IN | BODY MASS INDEX: 29.77 KG/M2

## 2024-09-16 DIAGNOSIS — Z12.31 VISIT FOR SCREENING MAMMOGRAM: ICD-10-CM

## 2024-09-16 PROCEDURE — 77063 BREAST TOMOSYNTHESIS BI: CPT
